# Patient Record
(demographics unavailable — no encounter records)

---

## 2024-10-10 NOTE — HISTORY OF PRESENT ILLNESS
[FreeTextEntry1] : pre appt chart review  61yo  PM woman w DM2 (not well controlled) here for annual exam  remote h/o vulvar itch and full left labia (intermittent)--continues  not sexually active,planning to start w new partner (maybe)

## 2024-10-10 NOTE — COUNSELING
[Nutrition/ Exercise/ Weight Management] : nutrition, exercise, weight management [Vitamins/Supplements] : vitamins/supplements [Breast Self Exam] : breast self exam [Bladder Hygiene] : bladder hygiene [STD (testing, results, tx)] : STD (testing, results, tx)

## 2024-10-10 NOTE — PHYSICAL EXAM
[Chaperone Declined] : Patient declined chaperone [Appropriately responsive] : appropriately responsive [Regular Rate Rhythm] : regular rate rhythm [No Murmurs] : no murmurs [Soft] : soft [Non-tender] : non-tender [Oriented x3] : oriented x3 [Examination Of The Breasts] : a normal appearance [No Masses] : no breast masses were palpable [Labia Majora] : normal [Labia Minora] : normal [Normal] : normal [Atrophy] : atrophy [Absent] : absent [FreeTextEntry6] : thickened axillary skin [FreeTextEntry1] : hypopigmentation b/l.  left more dense than right labia (nontender)

## 2024-10-10 NOTE — DISCUSSION/SUMMARY
[FreeTextEntry1] : well woman  vulvar itch suspect lichen sclerosis RTO for biopsy  safer sex practices

## 2025-04-29 NOTE — PROCEDURE
[de-identified] : Stroboscopic Laryngoscopy Procedure Note:  Indication:	Assess laryngeal biomechanics and vocal fold oscillation.  Description of Procedure:	Informed consent was verbally obtained from the patient prior to the procedure. The patient was seated in the clinic chair. Topical anesthesia was achieved by first spraying the nasal cavities with 4% lidocaine and nasal decongestant.   Findings:  Supraglottis: no masses or lesions  Glottis:    Structure:                        Right: crisp and shows no lesions or masses                        Left:  very minimal fullness left vibratroy edge concerning for very small pseduocyst                Mobility:                        Right:  normal                        Left:  normal                Amplitude:                        Right:  normal                       Left:  normal                Closure: posterior glottic gap                Wave symmetry:  mildly asymmetric  Subglottis: no masses or lesions within the visualized subglottis Visualized airway is widely patent. Other: Chevron sign

## 2025-04-29 NOTE — ASSESSMENT
[FreeTextEntry1] : Assessment/Plan: #1 Muscle tension dysphonia #2 Left vocal fold very small pseudocyst  After a thorough discussion, the patient understands that they have the diagnosis of muscle tension dysphonia.  This is caused by the excessive use of different muscles in the throat with phonation.  The standard treatment for this is voice therapy with a speech language pathologist.  This typically involves a first session of a Voice Evaluation where different measurements are taken of their voice and voice therapy is started.  It often involves a few more sessions of Voice Therapy afterwards.  The patient would be taught different exercises in order to release the tension in their larynx and return to their prior normal voice.  They understand that voice therapy will not work without doing the practice at home as prescribed.  They were also given the option of observation.  At this time the patient has elected to proceed forward with voice therapy.   I would like to see her back after voice therapy if voice not back to 100% by then.

## 2025-04-29 NOTE — HISTORY OF PRESENT ILLNESS
[de-identified] : DONTE SAMANIEGO is a 62 year old woman who presents to the [facility] with difficulty phonating. Hx of laminectomy in 2007 and AAA This problem has been going on since having laryngitis in 2022.  They describe their voice as hoarse and raspy - can no longer sing in choir. Voice cuts out altogether. Difficulty projecting. She now does not note periods of normal voicing. She does not note specific difficulties with swallowing. She does not note frequent classic heartburn symptoms. Smoking history: None Feels she always has a hard time taking a deep breath. Denies fevers/chills, throat pain, otalgia. Had BARAJAS for thyroid.  VOCAL DEMANDS: Her vocal demands are primarily those of general conversation.

## 2025-04-29 NOTE — PROCEDURE
[de-identified] : Stroboscopic Laryngoscopy Procedure Note:  Indication:	Assess laryngeal biomechanics and vocal fold oscillation.  Description of Procedure:	Informed consent was verbally obtained from the patient prior to the procedure. The patient was seated in the clinic chair. Topical anesthesia was achieved by first spraying the nasal cavities with 4% lidocaine and nasal decongestant.   Findings:  Supraglottis: no masses or lesions  Glottis:    Structure:                        Right: crisp and shows no lesions or masses                        Left:  very minimal fullness left vibratroy edge concerning for very small pseduocyst                Mobility:                        Right:  normal                        Left:  normal                Amplitude:                        Right:  normal                       Left:  normal                Closure: posterior glottic gap                Wave symmetry:  mildly asymmetric  Subglottis: no masses or lesions within the visualized subglottis Visualized airway is widely patent. Other: Chevron sign

## 2025-04-29 NOTE — HISTORY OF PRESENT ILLNESS
[de-identified] : DONTE SAMANIEGO is a 62 year old woman who presents to the [facility] with difficulty phonating. Hx of laminectomy in 2007 and AAA This problem has been going on since having laryngitis in 2022.  They describe their voice as hoarse and raspy - can no longer sing in choir. Voice cuts out altogether. Difficulty projecting. She now does not note periods of normal voicing. She does not note specific difficulties with swallowing. She does not note frequent classic heartburn symptoms. Smoking history: None Feels she always has a hard time taking a deep breath. Denies fevers/chills, throat pain, otalgia. Had BARAJAS for thyroid.  VOCAL DEMANDS: Her vocal demands are primarily those of general conversation.